# Patient Record
Sex: MALE | Race: WHITE | Employment: FULL TIME | URBAN - METROPOLITAN AREA
[De-identification: names, ages, dates, MRNs, and addresses within clinical notes are randomized per-mention and may not be internally consistent; named-entity substitution may affect disease eponyms.]

---

## 2018-08-15 ENCOUNTER — APPOINTMENT (OUTPATIENT)
Dept: GENERAL RADIOLOGY | Age: 23
End: 2018-08-15
Attending: EMERGENCY MEDICINE
Payer: SELF-PAY

## 2018-08-15 ENCOUNTER — HOSPITAL ENCOUNTER (EMERGENCY)
Age: 23
Discharge: OTHER INSTITUTION | End: 2018-08-15
Attending: EMERGENCY MEDICINE | Admitting: EMERGENCY MEDICINE
Payer: SELF-PAY

## 2018-08-15 ENCOUNTER — APPOINTMENT (OUTPATIENT)
Dept: CT IMAGING | Age: 23
End: 2018-08-15
Attending: EMERGENCY MEDICINE
Payer: SELF-PAY

## 2018-08-15 VITALS
SYSTOLIC BLOOD PRESSURE: 132 MMHG | TEMPERATURE: 98.2 F | OXYGEN SATURATION: 97 % | RESPIRATION RATE: 20 BRPM | WEIGHT: 185 LBS | HEIGHT: 76 IN | BODY MASS INDEX: 22.53 KG/M2 | HEART RATE: 90 BPM | DIASTOLIC BLOOD PRESSURE: 64 MMHG

## 2018-08-15 DIAGNOSIS — M25.511 ACUTE PAIN OF RIGHT SHOULDER: ICD-10-CM

## 2018-08-15 DIAGNOSIS — T79.7XXA SUBCUTANEOUS EMPHYSEMA, INITIAL ENCOUNTER (HCC): ICD-10-CM

## 2018-08-15 DIAGNOSIS — T07.XXXA ABRASIONS OF MULTIPLE SITES: ICD-10-CM
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
## 2018-08-15 DIAGNOSIS — F10.920 ALCOHOLIC INTOXICATION WITHOUT COMPLICATION (HCC): ICD-10-CM

## 2018-08-15 LAB
ANION GAP BLD CALC-SCNC: 19 MMOL/L (ref 10–20)
BUN BLD-MCNC: 14 MG/DL (ref 9–20)
CA-I BLD-MCNC: 1.16 MMOL/L (ref 1.12–1.32)
CHLORIDE BLD-SCNC: 102 MMOL/L (ref 98–107)
CO2 BLD-SCNC: 22 MMOL/L (ref 21–32)
COMMENT, HOLDF: NORMAL
CREAT BLD-MCNC: 1.1 MG/DL (ref 0.6–1.3)
GLUCOSE BLD-MCNC: 138 MG/DL (ref 65–100)
HCT VFR BLD CALC: 42 % (ref 36.6–50.3)
POTASSIUM BLD-SCNC: 3.5 MMOL/L (ref 3.5–5.1)
SAMPLES BEING HELD,HOLD: NORMAL
SERVICE CMNT-IMP: ABNORMAL
SODIUM BLD-SCNC: 138 MMOL/L (ref 136–145)

## 2018-08-15 PROCEDURE — 71045 X-RAY EXAM CHEST 1 VIEW: CPT

## 2018-08-15 PROCEDURE — 99285 EMERGENCY DEPT VISIT HI MDM: CPT

## 2018-08-15 PROCEDURE — 72125 CT NECK SPINE W/O DYE: CPT

## 2018-08-15 PROCEDURE — 70450 CT HEAD/BRAIN W/O DYE: CPT

## 2018-08-15 PROCEDURE — 73130 X-RAY EXAM OF HAND: CPT

## 2018-08-15 PROCEDURE — 74011636320 HC RX REV CODE- 636/320: Performed by: EMERGENCY MEDICINE

## 2018-08-15 PROCEDURE — 96374 THER/PROPH/DIAG INJ IV PUSH: CPT

## 2018-08-15 PROCEDURE — A4565 SLINGS: HCPCS

## 2018-08-15 PROCEDURE — 36415 COLL VENOUS BLD VENIPUNCTURE: CPT | Performed by: EMERGENCY MEDICINE

## 2018-08-15 PROCEDURE — 74177 CT ABD & PELVIS W/CONTRAST: CPT

## 2018-08-15 PROCEDURE — 96361 HYDRATE IV INFUSION ADD-ON: CPT

## 2018-08-15 PROCEDURE — 74011250637 HC RX REV CODE- 250/637: Performed by: EMERGENCY MEDICINE

## 2018-08-15 PROCEDURE — 94762 N-INVAS EAR/PLS OXIMTRY CONT: CPT

## 2018-08-15 PROCEDURE — 74011250636 HC RX REV CODE- 250/636: Performed by: EMERGENCY MEDICINE

## 2018-08-15 PROCEDURE — 73030 X-RAY EXAM OF SHOULDER: CPT

## 2018-08-15 PROCEDURE — 80047 BASIC METABLC PNL IONIZED CA: CPT

## 2018-08-15 PROCEDURE — 71260 CT THORAX DX C+: CPT

## 2018-08-15 RX ORDER — HYDROCODONE BITARTRATE AND ACETAMINOPHEN 7.5; 325 MG/1; MG/1
1 TABLET ORAL
Status: COMPLETED | OUTPATIENT
Start: 2018-08-15 | End: 2018-08-15

## 2018-08-15 RX ORDER — FENTANYL CITRATE 50 UG/ML
50 INJECTION, SOLUTION INTRAMUSCULAR; INTRAVENOUS
Status: COMPLETED | OUTPATIENT
Start: 2018-08-15 | End: 2018-08-15

## 2018-08-15 RX ADMIN — FENTANYL CITRATE 50 MCG: 50 INJECTION, SOLUTION INTRAMUSCULAR; INTRAVENOUS at 19:37

## 2018-08-15 RX ADMIN — IOPAMIDOL 100 ML: 755 INJECTION, SOLUTION INTRAVENOUS at 20:13

## 2018-08-15 RX ADMIN — SODIUM CHLORIDE 1000 ML: 900 INJECTION, SOLUTION INTRAVENOUS at 19:40

## 2018-08-15 RX ADMIN — HYDROCODONE BITARTRATE AND ACETAMINOPHEN 1 TABLET: 7.5; 325 TABLET ORAL at 21:40

## 2018-08-15 RX ADMIN — BACITRACIN ZINC, NEOMYCIN SULFATE, POLYMYXIN B SULFATE 1 PACKET: 3.5; 5000; 4 OINTMENT TOPICAL at 21:40
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
## 2018-08-16 NOTE — ED NOTES
Patient medicated per order. Patient tolerated well. Patient updated on plan of care. Patient verbalized good understanding. POC Chem 8 running at the bedside.

## 2018-08-16 NOTE — ED NOTES
TRANSFER - OUT REPORT:    Verbal report given to [facility] (name) on Felicia Wetzel.  being transferred to [facility] ED(unit) for routine progression of care       Report consisted of patients Situation, Background, Assessment and   Recommendations(SBAR). Information from the following report(s) SBAR, ED Summary, Procedure Summary, Intake/Output, MAR and Recent Results was reviewed with the receiving nurse. Lines:   Peripheral IV 08/15/18 Left; Anterior Antecubital (Active)   Site Assessment Clean, dry, & intact 8/15/2018  7:34 PM   Phlebitis Assessment 0 8/15/2018  7:34 PM   Infiltration Assessment 0 8/15/2018  7:34 PM   Dressing Status Clean, dry, & intact 8/15/2018  7:34 PM   Dressing Type Tape;Transparent 8/15/2018  7:34 PM   Hub Color/Line Status Green;Flushed;Patent 8/15/2018  7:34 PM   Action Taken Blood drawn 8/15/2018  7:34 PM        Opportunity for questions and clarification was provided.       Patient transported with:   Monitor  O2 @ 2 liters

## 2018-08-16 NOTE — ED NOTES
Abrasions to right upper extremity cleaned with wound cleanser and antibacterial ointment applied. Patient tolerated well. Bandaid applied. Sling applied to right arm.

## 2018-08-16 NOTE — ROUTINE PROCESS
TRANSFER - OUT REPORT:    Verbal report given to JORGE Chambers (name) on [place].  being transferred to [facility] ED(unit) for routine progression of care       Report consisted of patients Situation, Background, Assessment and   Recommendations(SBAR). Information from the following report(s) SBAR, ED Summary, Procedure Summary, Intake/Output, MAR and Recent Results was reviewed with the receiving nurse. Lines:   Peripheral IV 08/15/18 Left; Anterior Antecubital (Active)   Site Assessment Clean, dry, & intact 8/15/2018  7:34 PM   Phlebitis Assessment 0 8/15/2018  7:34 PM   Infiltration Assessment 0 8/15/2018  7:34 PM   Dressing Status Clean, dry, & intact 8/15/2018  7:34 PM   Dressing Type Tape;Transparent 8/15/2018  7:34 PM   Hub Color/Line Status Green;Flushed;Patent 8/15/2018  7:34 PM   Action Taken Blood drawn 8/15/2018  7:34 PM        Opportunity for questions and clarification was provided.       Patient transported with:   Monitor  O2 @ 2 liters

## 2018-08-16 NOTE — ED NOTES
Discharge or Transfer Assessment: Patient A&O x4 and in no distress. Physical re-examination reveals improvement in patient's range of motion and pain to left shoulder. Reassessment of vital signs completed at the time of admission transfer and/or discharge.